# Patient Record
Sex: FEMALE | HISPANIC OR LATINO | Employment: PART TIME | URBAN - METROPOLITAN AREA
[De-identification: names, ages, dates, MRNs, and addresses within clinical notes are randomized per-mention and may not be internally consistent; named-entity substitution may affect disease eponyms.]

---

## 2020-09-23 ENCOUNTER — OFFICE VISIT (OUTPATIENT)
Dept: URGENT CARE | Facility: URGENT CARE | Age: 45
End: 2020-09-23

## 2020-09-23 VITALS
DIASTOLIC BLOOD PRESSURE: 80 MMHG | SYSTOLIC BLOOD PRESSURE: 130 MMHG | HEART RATE: 54 BPM | RESPIRATION RATE: 16 BRPM | OXYGEN SATURATION: 98 % | TEMPERATURE: 98 F

## 2020-09-23 DIAGNOSIS — R51.9 SEVERE HEADACHE: Primary | ICD-10-CM

## 2020-09-23 PROCEDURE — 99203 OFFICE O/P NEW LOW 30 MIN: CPT | Performed by: PHYSICIAN ASSISTANT

## 2020-09-23 NOTE — PROGRESS NOTES
"Patient presents with:  Back Pain: Pt states she hurt her low back at work   Derm Problem: Pt states she has a cyst on head/ pt states she started having a bad headache on the lower R side today at work. Pt has MRI 2-3 weeks ago cyst has not moved in size       SUBJECTIVE:   Alexander Braswell is a 44 year old female presenting with a chief complaint   Of  1) Has a headache \"the worst headache of her life\" onset today suddenly at 11:30  On the right side.   It is much different and much worse than her typical headache.     2) back in jury at work (patient will need to check in under work comp for that injury)    Was diagnosed with a cyst on her brain 2 years ago in [facility].    Had an MRI for \"bad headaches\"  Her last MRI was 3 weeks ago here in [place] at [facility] at that time she was having \"small\" headaches.    PMH  Cyst on Brain    FH  HTN mother      Social History     Tobacco Use     Smoking status: Never Smoker     Smokeless tobacco: Never Used   Substance Use Topics     Alcohol use: Not on file       ROS:  CONSTITUTIONAL:NEGATIVE for fever, chills, change in weight  INTEGUMENTARY/SKIN: NEGATIVE for worrisome rashes, moles or lesions  EYES: NEGATIVE for vision changes or irritation  ENT/MOUTH: NEGATIVE for ear, mouth and throat problems  RESP:NEGATIVE for significant cough or SOB  CV: NEGATIVE for chest pain, palpitations or peripheral edema  GI: NEGATIVE for nausea, abdominal pain, heartburn, or change in bowel habits  NEURO: as per HPI  ENDOCRINE: NEGATIVE for temperature intolerance, skin/hair changes  HEME/ALLERGY/IMMUNE: NEGATIVE for bleeding problems  Review of systems negative except as stated above.    OBJECTIVE  :/80   Pulse 54   Temp 98  F (36.7  C) (Temporal)   Resp 16   SpO2 98%   GENERAL APPEARANCE: healthy, alert  NEURO: Normal strength and tone, sensory exam grossly normal,  normal speech and mentation  SKIN: no suspicious lesions or rashes or lumps     (R51) Severe headache "  (primary encounter diagnosis)  Comment:   Plan: to Emergency Room now for further evaluation   Patient will have someone drive her to ER now.       was used for the visit

## 2020-09-23 NOTE — PATIENT INSTRUCTIONS
(R51) Severe headache  (primary encounter diagnosis)  Comment:   Plan: to Emergency Room now for further evaluation   Patient will have someone drive her to ER now.

## 2022-01-05 ENCOUNTER — HOSPITAL ENCOUNTER (EMERGENCY)
Facility: CLINIC | Age: 47
Discharge: HOME | End: 2022-01-05
Attending: PHYSICIAN ASSISTANT | Admitting: PHYSICIAN ASSISTANT
Payer: COMMERCIAL

## 2022-01-05 VITALS
HEART RATE: 77 BPM | SYSTOLIC BLOOD PRESSURE: 118 MMHG | BODY MASS INDEX: 27.85 KG/M2 | OXYGEN SATURATION: 100 % | RESPIRATION RATE: 18 BRPM | WEIGHT: 163.14 LBS | DIASTOLIC BLOOD PRESSURE: 48 MMHG | TEMPERATURE: 96.8 F | HEIGHT: 64 IN
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
## 2022-01-05 DIAGNOSIS — S16.1XXA STRAIN OF NECK MUSCLE: ICD-10-CM

## 2022-01-05 DIAGNOSIS — M62.838 MUSCLE SPASM: ICD-10-CM

## 2022-01-05 DIAGNOSIS — M25.512 SHOULDER PAIN, LEFT: ICD-10-CM

## 2022-01-05 PROCEDURE — 96374 THER/PROPH/DIAG INJ IV PUSH: CPT

## 2022-01-05 PROCEDURE — 250N000011 HC RX IP 250 OP 636: Performed by: PHYSICIAN ASSISTANT

## 2022-01-05 PROCEDURE — 99284 EMERGENCY DEPT VISIT MOD MDM: CPT | Mod: 25

## 2022-01-05 PROCEDURE — 250N000013 HC RX MED GY IP 250 OP 250 PS 637: Performed by: PHYSICIAN ASSISTANT

## 2022-01-05 RX ORDER — LIDOCAINE 4 G/G
1 PATCH TOPICAL ONCE
Status: DISCONTINUED | OUTPATIENT
Start: 2022-01-05 | End: 2022-01-05 | Stop reason: HOSPADM

## 2022-01-05 RX ORDER — KETOROLAC TROMETHAMINE 15 MG/ML
15 INJECTION, SOLUTION INTRAMUSCULAR; INTRAVENOUS ONCE
Status: COMPLETED | OUTPATIENT
Start: 2022-01-05 | End: 2022-01-05

## 2022-01-05 RX ORDER — CYCLOBENZAPRINE HCL 10 MG
10 TABLET ORAL 3 TIMES DAILY PRN
Qty: 8 TABLET | Refills: 0 | Status: SHIPPED | OUTPATIENT
Start: 2022-01-05 | End: 2022-01-11

## 2022-01-05 RX ADMIN — KETOROLAC TROMETHAMINE 15 MG: 15 INJECTION, SOLUTION INTRAMUSCULAR; INTRAVENOUS at 19:54

## 2022-01-05 RX ADMIN — LIDOCAINE 1 PATCH: 246 PATCH TOPICAL at 19:50

## 2022-01-05 ASSESSMENT — MIFFLIN-ST. JEOR: SCORE: 1362.75

## 2022-01-05 ASSESSMENT — ENCOUNTER SYMPTOMS
DIZZINESS: 0
HEADACHES: 1
LIGHT-HEADEDNESS: 0
WEAKNESS: 0
NAUSEA: 1

## 2022-01-05 NOTE — LETTER
January 5, 2022      To Whom It May Concern:      Alexander Braswell was seen in our Emergency Department today, 01/05/22.  Please excuse her from work 1/5-1/7/21. She may return 1/8/21.     Sincerely,        Alison Bolton PA-C
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
## 2023-08-02 ENCOUNTER — APPOINTMENT (OUTPATIENT)
Dept: GENERAL RADIOLOGY | Facility: CLINIC | Age: 48
End: 2023-08-02
Attending: EMERGENCY MEDICINE
Payer: OTHER MISCELLANEOUS

## 2023-08-02 ENCOUNTER — APPOINTMENT (OUTPATIENT)
Dept: GENERAL RADIOLOGY | Facility: CLINIC | Age: 48
End: 2023-08-02
Attending: PHYSICIAN ASSISTANT
Payer: OTHER MISCELLANEOUS

## 2023-08-02 ENCOUNTER — APPOINTMENT (OUTPATIENT)
Dept: MRI IMAGING | Facility: CLINIC | Age: 48
End: 2023-08-02
Attending: PHYSICIAN ASSISTANT
Payer: OTHER MISCELLANEOUS

## 2023-08-02 ENCOUNTER — HOSPITAL ENCOUNTER (EMERGENCY)
Facility: CLINIC | Age: 48
Discharge: HOME | End: 2023-08-03
Attending: PHYSICIAN ASSISTANT | Admitting: PHYSICIAN ASSISTANT
Payer: OTHER MISCELLANEOUS

## 2023-08-02 DIAGNOSIS — M25.532 PAIN IN BOTH WRISTS: ICD-10-CM

## 2023-08-02 DIAGNOSIS — M54.2 ACUTE NECK PAIN: ICD-10-CM

## 2023-08-02 DIAGNOSIS — M25.531 PAIN IN BOTH WRISTS: ICD-10-CM

## 2023-08-02 DIAGNOSIS — S42.91XA CLOSED FRACTURE OF RIGHT SHOULDER, INITIAL ENCOUNTER: ICD-10-CM

## 2023-08-02 DIAGNOSIS — M79.642 PAIN OF LEFT HAND: ICD-10-CM

## 2023-08-02 PROCEDURE — 73130 X-RAY EXAM OF HAND: CPT | Mod: LT

## 2023-08-02 PROCEDURE — 99284 EMERGENCY DEPT VISIT MOD MDM: CPT | Mod: 25

## 2023-08-02 PROCEDURE — 73110 X-RAY EXAM OF WRIST: CPT | Mod: 50

## 2023-08-02 PROCEDURE — 73090 X-RAY EXAM OF FOREARM: CPT | Mod: RT

## 2023-08-02 PROCEDURE — 72141 MRI NECK SPINE W/O DYE: CPT

## 2023-08-02 PROCEDURE — 73030 X-RAY EXAM OF SHOULDER: CPT | Mod: 50

## 2023-08-02 RX ORDER — KETOROLAC TROMETHAMINE 30 MG/ML
30 INJECTION, SOLUTION INTRAMUSCULAR; INTRAVENOUS ONCE
Status: DISCONTINUED | OUTPATIENT
Start: 2023-08-02 | End: 2023-08-03

## 2023-08-02 ASSESSMENT — ACTIVITIES OF DAILY LIVING (ADL)
ADLS_ACUITY_SCORE: 35
ADLS_ACUITY_SCORE: 33

## 2023-08-02 NOTE — ED TRIAGE NOTES
Pt presents to ED with c/o bilateral shoulder pain x1 week.    Pt states that she injured herself at work.     Pt states that she was vacuuming, tripped on the cord and fell down. Pt alert and ambulatory.      used for triage.

## 2023-08-03 VITALS
OXYGEN SATURATION: 99 % | TEMPERATURE: 97.3 F | HEART RATE: 84 BPM | DIASTOLIC BLOOD PRESSURE: 79 MMHG | RESPIRATION RATE: 18 BRPM | SYSTOLIC BLOOD PRESSURE: 153 MMHG

## 2023-08-03 RX ORDER — HYDROCODONE BITARTRATE AND ACETAMINOPHEN 5; 325 MG/1; MG/1
1 TABLET ORAL EVERY 6 HOURS PRN
Qty: 12 TABLET | Refills: 0 | Status: SHIPPED | OUTPATIENT
Start: 2023-08-03 | End: 2023-08-06

## 2023-08-03 NOTE — ED PROVIDER NOTES
ED ATTENDING PHYSICIAN NOTE:   I evaluated this patient in conjunction with Gary Martinez PA-C  I have participated in the care of the patient and personally performed key elements of the history, exam, and medical decision making.      HPI:   Alexander Braswell is a 47 year old female who reports she had fallen while at work she said she was vacuuming the cord was behind her she tripped and fell backward onto her butt and on both hands.  She says since that time she has had pain over her hands and some pain along her shoulder she did not hit her head.  The patient reported it felt like whiplash.  History was obtained via the         Review of External Notes: PAs notes reviewed     EXAM:   General:  No respiratory distress    Cardiovascular: Good cap refill.    Respiratory: Breathing non labored.     Musculoskeletal: Patient is in a c-collar with some paraspinal tenderness.  There is tenderness over both of her shoulders.  There is tenderness over both wrists and her left hand    Skin: No rashes or petechiae     Neurologic: non focal patient has good  strength    Psychiatric: Appropriate       Independent Interpretation (X-rays, CTs, rhythm strip):  The x-rays showed only a potential fracture over the right shoulder        Social Determinants of Health affecting care:   Healthcare Access/Compliance     MEDICAL DECISION MAKING/ASSESSMENT AND PLAN:   The patient had had a fall likely causing cervical strain an MRI was ordered due to the potential for a cord injury.  There was no signs of fracture of the neck clinical I felt it was unlikely the lack of trauma the seem to be more musculoskeletal.  She had landed on both hands I considered fractures there x-rays were negative and the patient was discharged home in good condition.     DIAGNOSIS:     ICD-10-CM    1. Acute neck pain  M54.2       2. Closed fracture of right shoulder, initial encounter  S42.91XA       3. Pain in both wrists   M25.531     M25.532       4. Pain of left hand  M79.642                  Scribe Disclosure:  I, Amy Allyn, am serving as a scribe at 10:04 PM on 8/2/2023 to document services personally performed by Raul Puri MD based on my observations and the provider's statements to me.     8/2/2023  [facility] EMERGENCY DEPT     Raul Puri MD  08/03/23 0449
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
